# Patient Record
Sex: MALE | Race: WHITE | URBAN - METROPOLITAN AREA
[De-identification: names, ages, dates, MRNs, and addresses within clinical notes are randomized per-mention and may not be internally consistent; named-entity substitution may affect disease eponyms.]

---

## 2022-05-19 ENCOUNTER — OFFICE VISIT (OUTPATIENT)
Dept: URBAN - METROPOLITAN AREA CLINIC 30 | Facility: CLINIC | Age: 72
End: 2022-05-19
Payer: MEDICARE

## 2022-05-19 DIAGNOSIS — E11.9 TYPE 2 DIABETES MELLITUS W/O COMPLICATION: Primary | ICD-10-CM

## 2022-05-19 DIAGNOSIS — H25.813 COMBINED FORMS OF AGE-RELATED CATARACT, BILATERAL: ICD-10-CM

## 2022-05-19 DIAGNOSIS — H53.2 DIPLOPIA: ICD-10-CM

## 2022-05-19 DIAGNOSIS — Z79.84 LONG TERM USE OF ORAL ANTIDIABETIC DRUG: ICD-10-CM

## 2022-05-19 PROCEDURE — 99203 OFFICE O/P NEW LOW 30 MIN: CPT | Performed by: OPTOMETRIST

## 2022-05-19 PROCEDURE — 92134 CPTRZ OPH DX IMG PST SGM RTA: CPT | Performed by: OPTOMETRIST

## 2022-05-19 ASSESSMENT — INTRAOCULAR PRESSURE
OD: 13
OS: 13

## 2022-05-19 ASSESSMENT — KERATOMETRY
OD: 42.35
OS: 41.93

## 2022-05-19 ASSESSMENT — VISUAL ACUITY
OS: 20/40
OD: 20/25

## 2022-05-19 NOTE — IMPRESSION/PLAN
Impression: Diplopia: H53.2. Plan: Intermittent, only few times in the past few months, only in the evening, horizontal.
CT 5/2022: appears ortho at distance and near Pt denies fluctuations in blood sugar. EOMs are full. S/x TIA/CVA reviewed. Advised pt to immediately proceed to ER.

## 2022-05-19 NOTE — IMPRESSION/PLAN
Impression: Combined forms of age-related cataract, bilateral: H25.813. Plan: Tx not indicated. Monitor. Pt will continue c OTC readers at this time.

## 2022-05-19 NOTE — IMPRESSION/PLAN
Impression: Type 2 diabetes mellitus w/o complication: M59.1. Plan: No retinopathy on exam today. Patient educated on importance of BG control. Monitor annually.

## 2023-10-05 ENCOUNTER — OFFICE VISIT (OUTPATIENT)
Dept: URBAN - METROPOLITAN AREA CLINIC 30 | Facility: CLINIC | Age: 73
End: 2023-10-05
Payer: MEDICARE

## 2023-10-05 DIAGNOSIS — Z79.84 LONG TERM USE OF ORAL ANTIDIABETIC DRUG: ICD-10-CM

## 2023-10-05 DIAGNOSIS — H53.2 DIPLOPIA: ICD-10-CM

## 2023-10-05 DIAGNOSIS — E11.9 TYPE 2 DIABETES MELLITUS WITHOUT COMPLICATIONS: Primary | ICD-10-CM

## 2023-10-05 DIAGNOSIS — H25.813 COMBINED FORMS OF AGE-RELATED CATARACT, BILATERAL: ICD-10-CM

## 2023-10-05 DIAGNOSIS — H52.4 PRESBYOPIA: ICD-10-CM

## 2023-10-05 PROCEDURE — 92014 COMPRE OPH EXAM EST PT 1/>: CPT | Performed by: OPTOMETRIST

## 2023-10-05 PROCEDURE — 92134 CPTRZ OPH DX IMG PST SGM RTA: CPT | Performed by: OPTOMETRIST

## 2023-10-05 ASSESSMENT — VISUAL ACUITY
OD: 20/25
OS: 20/30

## 2023-10-05 ASSESSMENT — KERATOMETRY
OD: 42.25
OS: 42.22

## 2023-10-05 ASSESSMENT — INTRAOCULAR PRESSURE
OD: 16
OS: 16